# Patient Record
Sex: FEMALE | Race: WHITE | Employment: FULL TIME | URBAN - METROPOLITAN AREA
[De-identification: names, ages, dates, MRNs, and addresses within clinical notes are randomized per-mention and may not be internally consistent; named-entity substitution may affect disease eponyms.]

---

## 2022-11-04 PROBLEM — J45.909 ASTHMA: Status: ACTIVE | Noted: 2022-11-04

## 2024-08-27 ENCOUNTER — HOSPITAL ENCOUNTER (EMERGENCY)
Age: 24
Discharge: HOME | End: 2024-08-27
Payer: COMMERCIAL

## 2024-08-27 VITALS
SYSTOLIC BLOOD PRESSURE: 119 MMHG | OXYGEN SATURATION: 98 % | HEIGHT: 67 IN | WEIGHT: 179.9 LBS | TEMPERATURE: 99.2 F | HEART RATE: 80 BPM | DIASTOLIC BLOOD PRESSURE: 74 MMHG | RESPIRATION RATE: 17 BRPM | BODY MASS INDEX: 28.24 KG/M2

## 2024-08-27 DIAGNOSIS — N75.8 BARTHOLIN'S GLAND INFECTION: Primary | ICD-10-CM

## 2024-08-27 DIAGNOSIS — B34.9 VIRAL ILLNESS: ICD-10-CM

## 2024-08-27 LAB — SARS-COV-2 RDRP RESP QL NAA+PROBE: NOT DETECTED

## 2024-08-27 PROCEDURE — 99283 EMERGENCY DEPT VISIT LOW MDM: CPT

## 2024-08-27 PROCEDURE — 87635 SARS-COV-2 COVID-19 AMP PRB: CPT

## 2024-08-27 RX ORDER — LIDOCAINE HYDROCHLORIDE AND EPINEPHRINE 10; 10 MG/ML; UG/ML
20 INJECTION, SOLUTION INFILTRATION; PERINEURAL ONCE
Status: DISCONTINUED | OUTPATIENT
Start: 2024-08-27 | End: 2024-08-27 | Stop reason: HOSPADM

## 2024-08-27 RX ORDER — CEPHALEXIN 500 MG/1
500 CAPSULE ORAL 4 TIMES DAILY
Qty: 28 CAPSULE | Refills: 0 | Status: SHIPPED | OUTPATIENT
Start: 2024-08-27 | End: 2024-09-03

## 2024-08-27 ASSESSMENT — LIFESTYLE VARIABLES
HOW MANY STANDARD DRINKS CONTAINING ALCOHOL DO YOU HAVE ON A TYPICAL DAY: PATIENT DOES NOT DRINK
HOW OFTEN DO YOU HAVE A DRINK CONTAINING ALCOHOL: NEVER

## 2024-08-27 ASSESSMENT — PAIN DESCRIPTION - DESCRIPTORS: DESCRIPTORS: ACHING

## 2024-08-27 ASSESSMENT — PAIN DESCRIPTION - PAIN TYPE: TYPE: ACUTE PAIN

## 2024-08-27 ASSESSMENT — PAIN SCALES - GENERAL: PAINLEVEL_OUTOF10: 8

## 2024-08-27 ASSESSMENT — PAIN - FUNCTIONAL ASSESSMENT
PAIN_FUNCTIONAL_ASSESSMENT: ACTIVITIES ARE NOT PREVENTED
PAIN_FUNCTIONAL_ASSESSMENT: 0-10

## 2024-08-27 ASSESSMENT — PAIN DESCRIPTION - FREQUENCY: FREQUENCY: CONTINUOUS

## 2024-08-27 ASSESSMENT — PAIN DESCRIPTION - LOCATION: LOCATION: VAGINA

## 2024-08-27 NOTE — ED PROVIDER NOTES
[facility] EMERGENCY DEPARTMENT  EMERGENCY DEPARTMENT ENCOUNTER        Pt Name: Alize Pathak  MRN: [id number]  Birthdate 2000  Date of evaluation: 8/27/2024  Provider: Zohreh Cabrales PA-C  PCP: Margarita Bradshaw PA  Note Started: 11:14 AM EDT 8/27/24      FRANK. I have evaluated this patient.        CHIEF COMPLAINT       Chief Complaint   Patient presents with    Vaginal Pain     Onset a couple days ago more on left side states the inner side of the vaginal wall is \"huge\" reports pain w/ sitting and wiping. Reports she shaves down in the vaginal area but never had any issues like this happened before. Reports approx 5wk pregnant and KATIUSKA 4/29/2025; last period 7/23/2024.       HISTORY OF PRESENT ILLNESS: 1 or more Elements     History From: patient  Limitations to history : None    Alize Pathak is a 24 y.o. female who presents to the emergency department by private vehicle complaining of pain to her inner left labia.  Patient reports pain, swelling has gradually worsened severity over the past 5 to 7 days.  Denies any drainage from region.  Denies history of this previously.  Patient is currently 5 weeks pregnant.  Denies any vaginal bleeding or abdominal pain.  Denies any urinary symptoms, vaginal discharge or concern for new STDs exposure.  Additionally, reports she has been sick with bodyaches, cough, runny nose, chills for the past couple days.     Nursing Notes were all reviewed and agreed with or any disagreements were addressed in the HPI.    REVIEW OF SYSTEMS :      Review of Systems    Positives and Pertinent negatives as per HPI.     SURGICAL HISTORY   History reviewed. No pertinent surgical history.    CURRENTMEDICATIONS       Previous Medications    ALBUTEROL SULFATE HFA (VENTOLIN HFA) 108 (90 BASE) MCG/ACT INHALER    Inhale 2 puffs into the lungs 4 times daily as needed for Wheezing       ALLERGIES     Ibuprofen    FAMILYHISTORY       Family History   Problem Relation Age of      DISPOSITION Decision To Discharge 08/27/2024 11:39:11 AM  Condition at Disposition: Good      PATIENT REFERRED TO:  [facility] Emergency Department  [address] [facility] [address]  [phone]  Go to   If symptoms worsen    Sabin, Sarah Flesch, MD  [address] [facility] [address] [facility] [facility] [address]  [facility] [address]  [phone]    Call   For follow up and reevaluation in 48 hours.      DISCHARGE MEDICATIONS:  New Prescriptions    CEPHALEXIN (KEFLEX) 500 MG CAPSULE    Take 1 capsule by mouth 4 times daily for 7 days       DISCONTINUED MEDICATIONS:  Discontinued Medications    No medications on file              (Please note that portions of this note were completed with a voice recognition program.  Efforts were made to edit the dictations but occasionally words are mis-transcribed.)    Zohreh Cabrales PA-C (electronically signed)           Zohreh Cabrales PA-C  08/27/24 114

## 2024-08-27 NOTE — ED NOTES
Discharge and education instructions reviewed. Patient verbalized understanding, teach-back successful. Patient denied questions at this time. No acute distress noted. Patient instructed to follow-up as noted - return to emergency department if symptoms worsen. Patient verbalized understanding. Discharged per EDMD with discharge instructions.     
This RN chaperoned  Zohreh LARA for pelvic exam.  
Yes

## 2024-08-27 NOTE — DISCHARGE INSTRUCTIONS
Take baths a couple times a day as mentioned in the attached instructions.  Take antibiotics as prescribed.  If began having new or worsening symptoms contact your OB/GYN or return to the ED.  Follow-up in 48 hours with OB/GYN as an outpatient.  If you are still sick please wear a mask to the office for follow-up.

## 2024-08-27 NOTE — ED TRIAGE NOTES
Patient arrived to the ED ambulatory from home w/ complaints of vaginal pain.     Patient/EMS reports states Onset a couple days ago more on left side states the inner side of the vaginal wall is \"huge\" reports pain w/ sitting and wiping. Reports she shaves down in the vaginal area but never had any issues like this happened before. Reports approx 5wk pregnant and KATIUSKA 4/29/2025; last period 7/23/2024. Has a OBGYN appt on the 23rd of September    Patient A&O x 4, VSS,